# Patient Record
Sex: MALE | Race: OTHER | HISPANIC OR LATINO
[De-identification: names, ages, dates, MRNs, and addresses within clinical notes are randomized per-mention and may not be internally consistent; named-entity substitution may affect disease eponyms.]

---

## 2018-05-22 ENCOUNTER — RESULT REVIEW (OUTPATIENT)
Age: 63
End: 2018-05-22

## 2018-05-22 ENCOUNTER — OUTPATIENT (OUTPATIENT)
Dept: OUTPATIENT SERVICES | Facility: HOSPITAL | Age: 63
LOS: 1 days | End: 2018-05-22
Payer: COMMERCIAL

## 2018-05-22 DIAGNOSIS — C61 MALIGNANT NEOPLASM OF PROSTATE: ICD-10-CM

## 2018-05-22 PROCEDURE — 88321 CONSLTJ&REPRT SLD PREP ELSWR: CPT

## 2018-05-23 LAB — SURGICAL PATHOLOGY STUDY: SIGNIFICANT CHANGE UP

## 2018-06-08 VITALS
DIASTOLIC BLOOD PRESSURE: 68 MMHG | WEIGHT: 185.41 LBS | OXYGEN SATURATION: 96 % | HEIGHT: 67 IN | HEART RATE: 64 BPM | SYSTOLIC BLOOD PRESSURE: 141 MMHG | RESPIRATION RATE: 16 BRPM | TEMPERATURE: 98 F

## 2018-06-08 PROBLEM — Z00.00 ENCOUNTER FOR PREVENTIVE HEALTH EXAMINATION: Status: ACTIVE | Noted: 2018-06-08

## 2018-06-08 NOTE — ASU PATIENT PROFILE, ADULT - NS PRO AD PATIENT TYPE
Health Care Proxy (HCP)/Katina Linares---[phone] Katina Linares---[phone] daughter/Health Care Proxy (HCP)

## 2018-06-08 NOTE — ASU PATIENT PROFILE, ADULT - PSH
History of appendectomy Cataracts, both eyes    H/O cervical spine surgery    H/O hernia repair    History of appendectomy    History of cholecystectomy

## 2018-06-11 ENCOUNTER — OUTPATIENT (OUTPATIENT)
Dept: OUTPATIENT SERVICES | Facility: HOSPITAL | Age: 63
LOS: 1 days | Discharge: HOME | End: 2018-06-11
Payer: COMMERCIAL

## 2018-06-11 VITALS
RESPIRATION RATE: 16 BRPM | DIASTOLIC BLOOD PRESSURE: 66 MMHG | OXYGEN SATURATION: 98 % | HEART RATE: 665 BPM | SYSTOLIC BLOOD PRESSURE: 129 MMHG

## 2018-06-11 DIAGNOSIS — H26.9 UNSPECIFIED CATARACT: Chronic | ICD-10-CM

## 2018-06-11 DIAGNOSIS — Z90.49 ACQUIRED ABSENCE OF OTHER SPECIFIED PARTS OF DIGESTIVE TRACT: Chronic | ICD-10-CM

## 2018-06-11 DIAGNOSIS — Z98.890 OTHER SPECIFIED POSTPROCEDURAL STATES: Chronic | ICD-10-CM

## 2018-06-11 PROCEDURE — 77336 RADIATION PHYSICS CONSULT: CPT

## 2018-06-11 PROCEDURE — 77295 3-D RADIOTHERAPY PLAN: CPT

## 2018-06-11 PROCEDURE — 77332 RADIATION TREATMENT AID(S): CPT

## 2018-06-11 PROCEDURE — 77331 SPECIAL RADIATION DOSIMETRY: CPT

## 2018-06-11 PROCEDURE — 76000 FLUOROSCOPY <1 HR PHYS/QHP: CPT

## 2018-06-11 PROCEDURE — 55875 TRANSPERI NEEDLE PLACE PROS: CPT

## 2018-06-11 PROCEDURE — C1715: CPT

## 2018-06-11 PROCEDURE — 76965 ECHO GUIDANCE RADIOTHERAPY: CPT

## 2018-06-11 PROCEDURE — C2639: CPT

## 2018-06-11 PROCEDURE — 77778 APPLY INTERSTIT RADIAT COMPL: CPT

## 2018-06-11 RX ORDER — SODIUM CHLORIDE 9 MG/ML
1000 INJECTION INTRAMUSCULAR; INTRAVENOUS; SUBCUTANEOUS
Qty: 0 | Refills: 0 | Status: DISCONTINUED | OUTPATIENT
Start: 2018-06-11 | End: 2018-06-11

## 2018-06-11 NOTE — PACU DISCHARGE NOTE - COMMENTS
D/C INSTRUCTION PROVIDED AND REVIEWED WITH PATIENT AND DAUGHTER WITH VERBAL UNDERSTANDING.. PATIENT SEEN BY JASON FROM RADIOLOGY AND CLEARED FOR D/C HOME

## 2018-07-06 PROBLEM — E78.5 HYPERLIPIDEMIA, UNSPECIFIED: Chronic | Status: ACTIVE | Noted: 2018-06-08

## 2018-07-06 PROBLEM — C61 MALIGNANT NEOPLASM OF PROSTATE: Chronic | Status: ACTIVE | Noted: 2018-06-08

## 2018-07-13 ENCOUNTER — APPOINTMENT (OUTPATIENT)
Dept: CT IMAGING | Facility: HOSPITAL | Age: 63
End: 2018-07-13

## 2022-11-08 VITALS
HEART RATE: 67 BPM | HEIGHT: 67 IN | SYSTOLIC BLOOD PRESSURE: 140 MMHG | RESPIRATION RATE: 17 BRPM | DIASTOLIC BLOOD PRESSURE: 82 MMHG | WEIGHT: 188.05 LBS | OXYGEN SATURATION: 98 % | TEMPERATURE: 98 F

## 2022-11-08 RX ORDER — CHLORHEXIDINE GLUCONATE 213 G/1000ML
1 SOLUTION TOPICAL ONCE
Refills: 0 | Status: DISCONTINUED | OUTPATIENT
Start: 2022-11-10 | End: 2022-11-24

## 2022-11-08 NOTE — H&P ADULT - NSHPLABSRESULTS_GEN_ALL_CORE
13.0   4.41  )-----------( 205      ( 10 Nov 2022 08:19 )             40.2       11-10    136  |  104  |  11  ----------------------------<  101<H>  4.3   |  25  |  0.91    Ca    9.0      10 Nov 2022 08:19  Mg     2.1     11-10    TPro  6.8  /  Alb  4.3  /  TBili  0.5  /  DBili  x   /  AST  16  /  ALT  14  /  AlkPhos  91  11-10      PT/INR - ( 10 Nov 2022 08:19 )   PT: 12.5 sec;   INR: 1.05          PTT - ( 10 Nov 2022 08:19 )  PTT:28.3 sec    CARDIAC MARKERS ( 10 Nov 2022 08:19 )  x     / x     / 57 U/L / x     / 1.4 ng/mL            EKG: 13.0   4.41  )-----------( 205      ( 10 Nov 2022 08:19 )             40.2       11-10    136  |  104  |  11  ----------------------------<  101<H>  4.3   |  25  |  0.91    Ca    9.0      10 Nov 2022 08:19  Mg     2.1     11-10    TPro  6.8  /  Alb  4.3  /  TBili  0.5  /  DBili  x   /  AST  16  /  ALT  14  /  AlkPhos  91  11-10      PT/INR - ( 10 Nov 2022 08:19 )   PT: 12.5 sec;   INR: 1.05          PTT - ( 10 Nov 2022 08:19 )  PTT:28.3 sec    CARDIAC MARKERS ( 10 Nov 2022 08:19 )  x     / x     / 57 U/L / x     / 1.4 ng/mL            EK/10/2022 NSR 67 bpm TWI in lead III

## 2022-11-08 NOTE — H&P ADULT - ASSESSMENT
67 year old M with PMH HTN, HLD, prostate cancer, peripheral vascular disease presented to Dr. Zhang with c/o dyspnea on exertion with 2-3 blocks of walking; denies CP, dizziness, palpitations, orthopnea/PND, leg swelling, LOC, bleeding, melena/hematochezia, fever, chills, URI symptoms, or recent illness.  In light of risk factors, CCS class III anginal symptoms and abnormal nuclear stress test, pt now presents for cardiac catheterization with possible intervention if clinically indicated.     NPO for procedure  Cr ***, EF 60% on TTE 10/2022, euvolemic on exam. Will hydrate with IVF  cc bolus followed by NS 75 cc/hour as per hydration protocol  H/H ***, On ***, Denies recent GIB, melena, BRBPR, hematochezia, hematuria. Will load with  mg po x1 and Plavix 600 mg po x1  Consents signed and placed in patient chart    ASA Class III  Mallampati Class III    Risks & benefits of procedure and alternative therapy have been explained to the patient including but not limited to: allergic reaction, bleeding with possible need for blood transfusion, infection, renal and vascular compromise, limb damage, arrhythmia, stroke, vessel dissection/perforation, Myocardial infarction, emergent CABG. Informed consent obtained and in chart.       Patient a candidate for sedation: Yes  Sedation Type: Moderate 67 year old M with PMH HTN, HLD, prostate cancer, peripheral vascular disease presented to Dr. Zhang with c/o dyspnea on exertion with 2-3 blocks of walking; denies CP, dizziness, palpitations, orthopnea/PND, leg swelling, LOC, bleeding, melena/hematochezia, fever, chills, URI symptoms, or recent illness.  In light of risk factors, CCS class III anginal symptoms and abnormal nuclear stress test, pt now presents for cardiac catheterization with possible intervention if clinically indicated.     NPO for procedure  Cr 0.91, EF 60% on TTE 10/2022, euvolemic on exam. Will hydrate with IVF  cc bolus followed by NS 75 cc/hour as per hydration protocol  H/H 13.0/40.2, on Elqiuis 5mg BID last dose 11/05/2022 PM, Denies recent GIB, melena, BRBPR, hematochezia, hematuria. Will load with  mg po x1 and Plavix 600 mg po x1  Consents signed and placed in patient chart    ASA Class III  Mallampati Class III    Risks & benefits of procedure and alternative therapy have been explained to the patient including but not limited to: allergic reaction, bleeding with possible need for blood transfusion, infection, renal and vascular compromise, limb damage, arrhythmia, stroke, vessel dissection/perforation, Myocardial infarction, emergent CABG. Informed consent obtained and in chart.       Patient a candidate for sedation: Yes  Sedation Type: Moderate 67 year old M with PMH HTN, HLD, prostate cancer, peripheral vascular disease presented to Dr. Zhang with c/o dyspnea on exertion with 2-3 blocks of walking; denies CP, dizziness, palpitations, orthopnea/PND, leg swelling, LOC, bleeding, melena/hematochezia, fever, chills, URI symptoms, or recent illness.  In light of risk factors, CCS class III anginal symptoms and abnormal nuclear stress test, pt now presents for cardiac catheterization with possible intervention if clinically indicated.     NPO for procedure  Cr 0.91, EF 60% on TTE 10/2022, euvolemic on exam. Will hydrate with IVF  cc bolus followed by NS 75 cc/hour as per hydration protocol  H/H 13.0/40.2, on Elqiuis 5mg BID last dose 11/05/2022 PM, Denies recent GIB, melena, BRBPR, hematochezia, hematuria. Will load with  mg po x1 and Plavix 600 mg po x1  Consents signed with  (# [id number]) via [email or web address] and placed in patient chart    ASA Class III  Mallampati Class III    Risks & benefits of procedure and alternative therapy have been explained to the patient including but not limited to: allergic reaction, bleeding with possible need for blood transfusion, infection, renal and vascular compromise, limb damage, arrhythmia, stroke, vessel dissection/perforation, Myocardial infarction, emergent CABG. Informed consent obtained and in chart.       Patient a candidate for sedation: Yes  Sedation Type: Moderate 67 year old M with PMH HTN, HLD, prostate cancer, peripheral vascular disease, h/o DVT 07/2022 (on Eliquis, last dose 11/05 PM) who presented to Dr. Zhang with c/o dyspnea on exertion with 2-3 blocks of walking; denies CP, dizziness, palpitations, orthopnea/PND, leg swelling, LOC, bleeding, melena/hematochezia, fever, chills, URI symptoms, or recent illness.  In light of risk factors, CCS class III anginal symptoms and abnormal nuclear stress test, pt now presents for cardiac catheterization with possible intervention if clinically indicated.     NPO for procedure  Cr 0.91, EF 60% on TTE 10/2022, euvolemic on exam. Will hydrate with IVF  cc bolus followed by NS 75 cc/hour as per hydration protocol  H/H 13.0/40.2, on Elqiuis 5mg BID last dose 11/05/2022 PM, Denies recent GIB, melena, BRBPR, hematochezia, hematuria. Will load with  mg po x1 and Plavix 600 mg po x1  Consents signed with  (# [id number]) via [employer] and placed in patient chart    ASA Class III  Mallampati Class III    Risks & benefits of procedure and alternative therapy have been explained to the patient including but not limited to: allergic reaction, bleeding with possible need for blood transfusion, infection, renal and vascular compromise, limb damage, arrhythmia, stroke, vessel dissection/perforation, Myocardial infarction, emergent CABG. Informed consent obtained and in chart.       Patient a candidate for sedation: Yes  Sedation Type: Moderate

## 2022-11-08 NOTE — H&P ADULT - NSICDXPASTMEDICALHX_GEN_ALL_CORE_FT
PAST MEDICAL HISTORY:  HLD (hyperlipidemia)     HTN (hypertension)     Prostate cancer      PAST MEDICAL HISTORY:  DVT, lower extremity     HLD (hyperlipidemia)     HTN (hypertension)     Prostate cancer

## 2022-11-08 NOTE — H&P ADULT - NSICDXPASTSURGICALHX_GEN_ALL_CORE_FT
PAST SURGICAL HISTORY:  Cataracts, both eyes     H/O cervical spine surgery     H/O hernia repair     History of appendectomy     History of cholecystectomy

## 2022-11-08 NOTE — H&P ADULT - HISTORY OF PRESENT ILLNESS
COVID:   Pharmacy:  Cardiologist: Dr. Zhang  Escort:    Confirm meds, supposedly only tylenol    67 year old M with PMH HTN, HLD, prostate cancer, peripheral vascular disease presented to Dr. Zhang with c/o dyspnea on exertion with 2-3 blocks of walking; denies CP, dizziness, palpitations, orthopnea/PND, leg swelling, LOC, bleeding, melena/hematochezia, fever, chills, URI symptoms, or recent illness.     Nuclear stress test (10/31/2022): negative for ischemic changes; myocardial perfusion scan was suggestive of mild ischemia in the LAD, with mild reduced uptake at apex and left anteroseptal wall, both reversible, and fixed decreased uptake in the inferior wall  TTE (10/30/2022): LVEF 60%, mild diastolic dysfunction is present, mild AS, AR, TR, SD; ascending aorta and aortic root both dilated.    In light of risk factors, CCS class III anginal symptoms and abnormal nuclear stress test, pt now presents for cardiac catheterization with possible intervention if clinically indicated.  COVID: 11/8/2022 not detected (in HIE)  Pharmacy:  Cardiologist: Dr. Zhang  Escort:    Confirm meds, supposedly only tylenol    67 year old M with PMH HTN, HLD, prostate cancer, peripheral vascular disease presented to Dr. Zhang with c/o dyspnea on exertion with 2-3 blocks of walking; denies CP, dizziness, palpitations, orthopnea/PND, leg swelling, LOC, bleeding, melena/hematochezia, fever, chills, URI symptoms, or recent illness. Pt subsequently underwent Nuclear stress test (10/31/2022): negative for ischemic changes; myocardial perfusion scan was suggestive of mild ischemia in the LAD, with mild reduced uptake at apex and left anteroseptal wall, both reversible, and fixed decreased uptake in the inferior wall and TTE (10/30/2022): LVEF 60%, mild diastolic dysfunction is present, mild AS, AR, TR, ME; ascending aorta and aortic root both dilated. In light of risk factors, CCS class III anginal symptoms and abnormal nuclear stress test, pt now presents for cardiac catheterization with possible intervention if clinically indicated.  COVID: 11/8/2022 not detected (in HIE)  Pharmacy: [facility] (meds confirmed with patient/pill bottles)  Cardiologist: Dr. Zhang  Escort: daughter      67 year old M with PMH HTN, HLD, prostate cancer, peripheral vascular disease, h/o DVT 07/2022 (on Eliquis, last dose 11/05 PM) who presented to Dr. Zhang with c/o dyspnea on exertion with 2-3 blocks of walking; denies CP, dizziness, palpitations, orthopnea/PND, leg swelling, LOC, bleeding, melena/hematochezia, fever, chills, URI symptoms, or recent illness. Pt subsequently underwent Nuclear stress test (10/31/2022): negative for ischemic changes; myocardial perfusion scan was suggestive of mild ischemia in the LAD, with mild reduced uptake at apex and left anteroseptal wall, both reversible, and fixed decreased uptake in the inferior wall and TTE (10/30/2022): LVEF 60%, mild diastolic dysfunction is present, mild AS, AR, TR, MO; ascending aorta and aortic root both dilated. In light of risk factors, CCS class III anginal symptoms and abnormal nuclear stress test, pt now presents for cardiac catheterization with possible intervention if clinically indicated.

## 2022-11-10 ENCOUNTER — OUTPATIENT (OUTPATIENT)
Dept: OUTPATIENT SERVICES | Facility: HOSPITAL | Age: 67
LOS: 1 days | Discharge: HOME | End: 2022-11-10
Payer: MEDICARE

## 2022-11-10 DIAGNOSIS — H26.9 UNSPECIFIED CATARACT: Chronic | ICD-10-CM

## 2022-11-10 DIAGNOSIS — Z98.890 OTHER SPECIFIED POSTPROCEDURAL STATES: Chronic | ICD-10-CM

## 2022-11-10 DIAGNOSIS — Z90.49 ACQUIRED ABSENCE OF OTHER SPECIFIED PARTS OF DIGESTIVE TRACT: Chronic | ICD-10-CM

## 2022-11-10 LAB
A1C WITH ESTIMATED AVERAGE GLUCOSE RESULT: 5.7 % — HIGH (ref 4–5.6)
ALBUMIN SERPL ELPH-MCNC: 4.3 G/DL — SIGNIFICANT CHANGE UP (ref 3.3–5)
ALP SERPL-CCNC: 91 U/L — SIGNIFICANT CHANGE UP (ref 40–120)
ALT FLD-CCNC: 14 U/L — SIGNIFICANT CHANGE UP (ref 10–45)
ANION GAP SERPL CALC-SCNC: 7 MMOL/L — SIGNIFICANT CHANGE UP (ref 5–17)
APTT BLD: 28.3 SEC — SIGNIFICANT CHANGE UP (ref 27.5–35.5)
AST SERPL-CCNC: 16 U/L — SIGNIFICANT CHANGE UP (ref 10–40)
BASOPHILS # BLD AUTO: 0.04 K/UL — SIGNIFICANT CHANGE UP (ref 0–0.2)
BASOPHILS NFR BLD AUTO: 0.9 % — SIGNIFICANT CHANGE UP (ref 0–2)
BILIRUB SERPL-MCNC: 0.5 MG/DL — SIGNIFICANT CHANGE UP (ref 0.2–1.2)
BUN SERPL-MCNC: 11 MG/DL — SIGNIFICANT CHANGE UP (ref 7–23)
CALCIUM SERPL-MCNC: 9 MG/DL — SIGNIFICANT CHANGE UP (ref 8.4–10.5)
CHLORIDE SERPL-SCNC: 104 MMOL/L — SIGNIFICANT CHANGE UP (ref 96–108)
CHOLEST SERPL-MCNC: 202 MG/DL — HIGH
CK MB CFR SERPL CALC: 1.4 NG/ML — SIGNIFICANT CHANGE UP (ref 0–6.7)
CK SERPL-CCNC: 57 U/L — SIGNIFICANT CHANGE UP (ref 30–200)
CO2 SERPL-SCNC: 25 MMOL/L — SIGNIFICANT CHANGE UP (ref 22–31)
CREAT SERPL-MCNC: 0.91 MG/DL — SIGNIFICANT CHANGE UP (ref 0.5–1.3)
EGFR: 94 ML/MIN/1.73M2 — SIGNIFICANT CHANGE UP
EOSINOPHIL # BLD AUTO: 0.07 K/UL — SIGNIFICANT CHANGE UP (ref 0–0.5)
EOSINOPHIL NFR BLD AUTO: 1.6 % — SIGNIFICANT CHANGE UP (ref 0–6)
ESTIMATED AVERAGE GLUCOSE: 117 MG/DL — HIGH (ref 68–114)
GLUCOSE SERPL-MCNC: 101 MG/DL — HIGH (ref 70–99)
HCT VFR BLD CALC: 40.2 % — SIGNIFICANT CHANGE UP (ref 39–50)
HDLC SERPL-MCNC: 63 MG/DL — SIGNIFICANT CHANGE UP
HGB BLD-MCNC: 13 G/DL — SIGNIFICANT CHANGE UP (ref 13–17)
IMM GRANULOCYTES NFR BLD AUTO: 0.5 % — SIGNIFICANT CHANGE UP (ref 0–0.9)
INR BLD: 1.05 — SIGNIFICANT CHANGE UP (ref 0.88–1.16)
LIPID PNL WITH DIRECT LDL SERPL: 114 MG/DL — HIGH
LYMPHOCYTES # BLD AUTO: 1.63 K/UL — SIGNIFICANT CHANGE UP (ref 1–3.3)
LYMPHOCYTES # BLD AUTO: 37 % — SIGNIFICANT CHANGE UP (ref 13–44)
MAGNESIUM SERPL-MCNC: 2.1 MG/DL — SIGNIFICANT CHANGE UP (ref 1.6–2.6)
MCHC RBC-ENTMCNC: 27.5 PG — SIGNIFICANT CHANGE UP (ref 27–34)
MCHC RBC-ENTMCNC: 32.3 GM/DL — SIGNIFICANT CHANGE UP (ref 32–36)
MCV RBC AUTO: 85 FL — SIGNIFICANT CHANGE UP (ref 80–100)
MONOCYTES # BLD AUTO: 0.41 K/UL — SIGNIFICANT CHANGE UP (ref 0–0.9)
MONOCYTES NFR BLD AUTO: 9.3 % — SIGNIFICANT CHANGE UP (ref 2–14)
NEUTROPHILS # BLD AUTO: 2.24 K/UL — SIGNIFICANT CHANGE UP (ref 1.8–7.4)
NEUTROPHILS NFR BLD AUTO: 50.7 % — SIGNIFICANT CHANGE UP (ref 43–77)
NON HDL CHOLESTEROL: 139 MG/DL — HIGH
NRBC # BLD: 0 /100 WBCS — SIGNIFICANT CHANGE UP (ref 0–0)
PLATELET # BLD AUTO: 205 K/UL — SIGNIFICANT CHANGE UP (ref 150–400)
POTASSIUM SERPL-MCNC: 4.3 MMOL/L — SIGNIFICANT CHANGE UP (ref 3.5–5.3)
POTASSIUM SERPL-SCNC: 4.3 MMOL/L — SIGNIFICANT CHANGE UP (ref 3.5–5.3)
PROT SERPL-MCNC: 6.8 G/DL — SIGNIFICANT CHANGE UP (ref 6–8.3)
PROTHROM AB SERPL-ACNC: 12.5 SEC — SIGNIFICANT CHANGE UP (ref 10.5–13.4)
RBC # BLD: 4.73 M/UL — SIGNIFICANT CHANGE UP (ref 4.2–5.8)
RBC # FLD: 13.6 % — SIGNIFICANT CHANGE UP (ref 10.3–14.5)
SODIUM SERPL-SCNC: 136 MMOL/L — SIGNIFICANT CHANGE UP (ref 135–145)
TRIGL SERPL-MCNC: 124 MG/DL — SIGNIFICANT CHANGE UP
WBC # BLD: 4.41 K/UL — SIGNIFICANT CHANGE UP (ref 3.8–10.5)
WBC # FLD AUTO: 4.41 K/UL — SIGNIFICANT CHANGE UP (ref 3.8–10.5)

## 2022-11-10 PROCEDURE — 93458 L HRT ARTERY/VENTRICLE ANGIO: CPT | Mod: 26

## 2022-11-10 PROCEDURE — C1887: CPT

## 2022-11-10 PROCEDURE — 80053 COMPREHEN METABOLIC PANEL: CPT

## 2022-11-10 PROCEDURE — 83735 ASSAY OF MAGNESIUM: CPT

## 2022-11-10 PROCEDURE — 85610 PROTHROMBIN TIME: CPT

## 2022-11-10 PROCEDURE — 99152 MOD SED SAME PHYS/QHP 5/>YRS: CPT

## 2022-11-10 PROCEDURE — 85025 COMPLETE CBC W/AUTO DIFF WBC: CPT

## 2022-11-10 PROCEDURE — 93010 ELECTROCARDIOGRAM REPORT: CPT

## 2022-11-10 PROCEDURE — 93005 ELECTROCARDIOGRAM TRACING: CPT

## 2022-11-10 PROCEDURE — C1894: CPT

## 2022-11-10 PROCEDURE — 85730 THROMBOPLASTIN TIME PARTIAL: CPT

## 2022-11-10 PROCEDURE — 82550 ASSAY OF CK (CPK): CPT

## 2022-11-10 PROCEDURE — 82553 CREATINE MB FRACTION: CPT

## 2022-11-10 PROCEDURE — C1769: CPT

## 2022-11-10 PROCEDURE — 83036 HEMOGLOBIN GLYCOSYLATED A1C: CPT

## 2022-11-10 PROCEDURE — 80061 LIPID PANEL: CPT

## 2022-11-10 PROCEDURE — 93458 L HRT ARTERY/VENTRICLE ANGIO: CPT

## 2022-11-10 RX ORDER — ASPIRIN/CALCIUM CARB/MAGNESIUM 324 MG
325 TABLET ORAL ONCE
Refills: 0 | Status: COMPLETED | OUTPATIENT
Start: 2022-11-10 | End: 2022-11-10

## 2022-11-10 RX ORDER — ATORVASTATIN CALCIUM 80 MG/1
1 TABLET, FILM COATED ORAL
Qty: 30 | Refills: 2
Start: 2022-11-10 | End: 2023-02-07

## 2022-11-10 RX ORDER — ACETAMINOPHEN 500 MG
2 TABLET ORAL
Qty: 0 | Refills: 0 | DISCHARGE

## 2022-11-10 RX ORDER — METOPROLOL TARTRATE 50 MG
1 TABLET ORAL
Qty: 0 | Refills: 0 | DISCHARGE

## 2022-11-10 RX ORDER — CLOPIDOGREL BISULFATE 75 MG/1
600 TABLET, FILM COATED ORAL ONCE
Refills: 0 | Status: COMPLETED | OUTPATIENT
Start: 2022-11-10 | End: 2022-11-10

## 2022-11-10 RX ORDER — APIXABAN 2.5 MG/1
1 TABLET, FILM COATED ORAL
Qty: 0 | Refills: 0 | DISCHARGE

## 2022-11-10 RX ORDER — SODIUM CHLORIDE 9 MG/ML
500 INJECTION INTRAMUSCULAR; INTRAVENOUS; SUBCUTANEOUS
Refills: 0 | Status: DISCONTINUED | OUTPATIENT
Start: 2022-11-10 | End: 2022-11-10

## 2022-11-10 RX ORDER — SODIUM CHLORIDE 9 MG/ML
500 INJECTION INTRAMUSCULAR; INTRAVENOUS; SUBCUTANEOUS
Refills: 0 | Status: DISCONTINUED | OUTPATIENT
Start: 2022-11-10 | End: 2022-11-24

## 2022-11-10 RX ORDER — SODIUM CHLORIDE 9 MG/ML
250 INJECTION INTRAMUSCULAR; INTRAVENOUS; SUBCUTANEOUS ONCE
Refills: 0 | Status: COMPLETED | OUTPATIENT
Start: 2022-11-10 | End: 2022-11-10

## 2022-11-10 RX ADMIN — Medication 325 MILLIGRAM(S): at 10:04

## 2022-11-10 RX ADMIN — SODIUM CHLORIDE 500 MILLILITER(S): 9 INJECTION INTRAMUSCULAR; INTRAVENOUS; SUBCUTANEOUS at 10:04

## 2022-11-10 RX ADMIN — CLOPIDOGREL BISULFATE 600 MILLIGRAM(S): 75 TABLET, FILM COATED ORAL at 10:04

## 2022-11-10 RX ADMIN — SODIUM CHLORIDE 75 MILLILITER(S): 9 INJECTION INTRAMUSCULAR; INTRAVENOUS; SUBCUTANEOUS at 10:04

## 2022-11-10 NOTE — PROGRESS NOTE ADULT - SUBJECTIVE AND OBJECTIVE BOX
Interventional Cardiology PA SDA Discharge Note    Patient without complaints. Ambulated and voided without difficulties    Afebrile, VSS      Ext:   Right Radial: no hematoma or bleeding, dressing C/D/I  Pulses: intact RAD to baseline     A/P:    67 year old M with PMH HTN, HLD, prostate cancer, peripheral vascular disease, h/o DVT 07/2022 (on Eliquis, last dose 11/05 PM) who presented to Dr. Zhang with c/o dyspnea on exertion with 2-3 blocks of walking; denies CP, dizziness, palpitations, orthopnea/PND, leg swelling, LOC, bleeding, melena/hematochezia, fever, chills, URI symptoms, or recent illness.  In light of risk factors, CCS class III anginal symptoms and abnormal nuclear stress test, pt now presents for cardiac catheterization with possible intervention if clinically indicated.   He is now s/p dx cath showing non-obstructive CAD (distal LAD 30-50%). R radial access (TR band removed without complication).     -medical management  -Stable for discharge as per attending Dr. Zhang after bed rest, pt voids, wrist stable and 30 minutes of ambulation.  -Follow-up with Cardiologist Dr. Zhang within 1-2 weeks  -Discharge forms signed and copies in chart   -Discharge Order Entered

## 2022-11-14 DIAGNOSIS — I25.110 ATHEROSCLEROTIC HEART DISEASE OF NATIVE CORONARY ARTERY WITH UNSTABLE ANGINA PECTORIS: ICD-10-CM

## 2022-11-14 DIAGNOSIS — R94.39 ABNORMAL RESULT OF OTHER CARDIOVASCULAR FUNCTION STUDY: ICD-10-CM
